# Patient Record
Sex: MALE | Race: WHITE | Employment: OTHER | URBAN - METROPOLITAN AREA
[De-identification: names, ages, dates, MRNs, and addresses within clinical notes are randomized per-mention and may not be internally consistent; named-entity substitution may affect disease eponyms.]

---

## 2017-04-25 ENCOUNTER — HOSPITAL ENCOUNTER (OUTPATIENT)
Dept: ULTRASOUND IMAGING | Age: 77
Discharge: HOME | End: 2017-04-25
Payer: MEDICARE

## 2017-04-25 DIAGNOSIS — M79.89 SWELLING OF LIMB: ICD-10-CM

## 2017-04-25 DIAGNOSIS — M79.661 PAIN OF RIGHT CALF: ICD-10-CM

## 2017-04-25 PROCEDURE — 93971 EXTREMITY STUDY: CPT

## 2017-11-12 ENCOUNTER — HOSPITAL ENCOUNTER (INPATIENT)
Age: 77
LOS: 2 days | Discharge: HOSPICE | DRG: 948 | End: 2017-11-14
Attending: EMERGENCY MEDICINE | Admitting: INTERNAL MEDICINE
Payer: MEDICARE

## 2017-11-12 ENCOUNTER — APPOINTMENT (OUTPATIENT)
Dept: GENERAL RADIOLOGY | Age: 77
DRG: 948 | End: 2017-11-12
Payer: MEDICARE

## 2017-11-12 DIAGNOSIS — C50.911 PRIMARY MALIGNANT NEOPLASM OF RIGHT BREAST WITH METASTASIS TO OTHER SITE (HCC): Primary | ICD-10-CM

## 2017-11-12 DIAGNOSIS — E86.0 DEHYDRATION: ICD-10-CM

## 2017-11-12 DIAGNOSIS — Z66 DNR (DO NOT RESUSCITATE): ICD-10-CM

## 2017-11-12 DIAGNOSIS — R52 PAIN MANAGEMENT: ICD-10-CM

## 2017-11-12 PROBLEM — G89.3 PAIN DUE TO MALIGNANT NEOPLASM METASTATIC TO BONE (HCC): Status: ACTIVE | Noted: 2017-11-12

## 2017-11-12 PROBLEM — C79.51 PAIN DUE TO MALIGNANT NEOPLASM METASTATIC TO BONE (HCC): Status: ACTIVE | Noted: 2017-11-12

## 2017-11-12 LAB
ALBUMIN SERPL-MCNC: 4.1 G/DL (ref 3.9–4.9)
ALP BLD-CCNC: 276 U/L (ref 35–104)
ALT SERPL-CCNC: 25 U/L (ref 0–41)
ANION GAP SERPL CALCULATED.3IONS-SCNC: 14 MEQ/L (ref 7–13)
ANISOCYTOSIS: PRESENT
AST SERPL-CCNC: 61 U/L (ref 0–40)
BASOPHILS ABSOLUTE: 0 K/UL (ref 0–0.2)
BASOPHILS RELATIVE PERCENT: 0.2 %
BILIRUB SERPL-MCNC: 1 MG/DL (ref 0–1.2)
BILIRUBIN URINE: NEGATIVE
BLOOD, URINE: NEGATIVE
BUN BLDV-MCNC: 27 MG/DL (ref 8–23)
CALCIUM SERPL-MCNC: 9.9 MG/DL (ref 8.6–10.2)
CHLORIDE BLD-SCNC: 94 MEQ/L (ref 98–107)
CLARITY: CLEAR
CO2: 26 MEQ/L (ref 22–29)
COLOR: YELLOW
CREAT SERPL-MCNC: 0.68 MG/DL (ref 0.7–1.2)
EOSINOPHILS ABSOLUTE: 0 K/UL (ref 0–0.7)
EOSINOPHILS RELATIVE PERCENT: 0.3 %
GFR AFRICAN AMERICAN: >60
GFR NON-AFRICAN AMERICAN: >60
GLOBULIN: 4.2 G/DL (ref 2.3–3.5)
GLUCOSE BLD-MCNC: 116 MG/DL (ref 74–109)
GLUCOSE URINE: NEGATIVE MG/DL
HCT VFR BLD CALC: 29.9 % (ref 42–52)
HEMOGLOBIN: 10 G/DL (ref 14–18)
INR BLD: 1.1
KETONES, URINE: NEGATIVE MG/DL
LEUKOCYTE ESTERASE, URINE: NEGATIVE
LYMPHOCYTES ABSOLUTE: 0.5 K/UL (ref 1–4.8)
LYMPHOCYTES RELATIVE PERCENT: 9.7 %
MAGNESIUM: 2.6 MG/DL (ref 1.7–2.3)
MCH RBC QN AUTO: 31.5 PG (ref 27–31.3)
MCHC RBC AUTO-ENTMCNC: 33.4 % (ref 33–37)
MCV RBC AUTO: 94.3 FL (ref 80–100)
MONOCYTES ABSOLUTE: 0.3 K/UL (ref 0.2–0.8)
MONOCYTES RELATIVE PERCENT: 5.6 %
NEUTROPHILS ABSOLUTE: 4.1 K/UL (ref 1.4–6.5)
NEUTROPHILS RELATIVE PERCENT: 84.2 %
NITRITE, URINE: NEGATIVE
PDW BLD-RTO: 19.4 % (ref 11.5–14.5)
PH UA: 5.5 (ref 5–9)
PLATELET # BLD: 121 K/UL (ref 130–400)
POTASSIUM SERPL-SCNC: 5.1 MEQ/L (ref 3.5–5.1)
PROTEIN UA: NEGATIVE MG/DL
PROTHROMBIN TIME: 11 SEC (ref 9.6–12.3)
RBC # BLD: 3.17 M/UL (ref 4.7–6.1)
SODIUM BLD-SCNC: 134 MEQ/L (ref 132–144)
SPECIFIC GRAVITY UA: 1.01 (ref 1–1.03)
TOTAL PROTEIN: 8.3 G/DL (ref 6.4–8.1)
URINE REFLEX TO CULTURE: ABNORMAL
UROBILINOGEN, URINE: 2 E.U./DL
WBC # BLD: 4.9 K/UL (ref 4.8–10.8)

## 2017-11-12 PROCEDURE — 74000 XR ABDOMEN LIMITED (KUB): CPT

## 2017-11-12 PROCEDURE — 6360000002 HC RX W HCPCS: Performed by: EMERGENCY MEDICINE

## 2017-11-12 PROCEDURE — 1210000000 HC MED SURG R&B

## 2017-11-12 PROCEDURE — 6360000002 HC RX W HCPCS: Performed by: INTERNAL MEDICINE

## 2017-11-12 PROCEDURE — 99285 EMERGENCY DEPT VISIT HI MDM: CPT

## 2017-11-12 PROCEDURE — 80053 COMPREHEN METABOLIC PANEL: CPT

## 2017-11-12 PROCEDURE — 96375 TX/PRO/DX INJ NEW DRUG ADDON: CPT

## 2017-11-12 PROCEDURE — 6370000000 HC RX 637 (ALT 250 FOR IP): Performed by: INTERNAL MEDICINE

## 2017-11-12 PROCEDURE — 85025 COMPLETE CBC W/AUTO DIFF WBC: CPT

## 2017-11-12 PROCEDURE — 83735 ASSAY OF MAGNESIUM: CPT

## 2017-11-12 PROCEDURE — 2580000003 HC RX 258: Performed by: EMERGENCY MEDICINE

## 2017-11-12 PROCEDURE — 96374 THER/PROPH/DIAG INJ IV PUSH: CPT

## 2017-11-12 PROCEDURE — 71010 XR CHEST PORTABLE: CPT

## 2017-11-12 PROCEDURE — 81003 URINALYSIS AUTO W/O SCOPE: CPT

## 2017-11-12 PROCEDURE — 36415 COLL VENOUS BLD VENIPUNCTURE: CPT

## 2017-11-12 PROCEDURE — 2580000003 HC RX 258: Performed by: INTERNAL MEDICINE

## 2017-11-12 PROCEDURE — 85610 PROTHROMBIN TIME: CPT

## 2017-11-12 RX ORDER — ONDANSETRON 2 MG/ML
4 INJECTION INTRAMUSCULAR; INTRAVENOUS ONCE
Status: COMPLETED | OUTPATIENT
Start: 2017-11-12 | End: 2017-11-12

## 2017-11-12 RX ORDER — GABAPENTIN 100 MG/1
100 CAPSULE ORAL 2 TIMES DAILY
Status: DISCONTINUED | OUTPATIENT
Start: 2017-11-12 | End: 2017-11-14 | Stop reason: HOSPADM

## 2017-11-12 RX ORDER — FENTANYL 25 UG/H
1 PATCH TRANSDERMAL
Status: DISCONTINUED | OUTPATIENT
Start: 2017-11-12 | End: 2017-11-14 | Stop reason: HOSPADM

## 2017-11-12 RX ORDER — SODIUM CHLORIDE 0.9 % (FLUSH) 0.9 %
10 SYRINGE (ML) INJECTION EVERY 12 HOURS SCHEDULED
Status: DISCONTINUED | OUTPATIENT
Start: 2017-11-12 | End: 2017-11-14 | Stop reason: HOSPADM

## 2017-11-12 RX ORDER — SODIUM CHLORIDE 0.9 % (FLUSH) 0.9 %
10 SYRINGE (ML) INJECTION PRN
Status: DISCONTINUED | OUTPATIENT
Start: 2017-11-12 | End: 2017-11-14 | Stop reason: HOSPADM

## 2017-11-12 RX ORDER — ONDANSETRON 2 MG/ML
4 INJECTION INTRAMUSCULAR; INTRAVENOUS EVERY 6 HOURS PRN
Status: DISCONTINUED | OUTPATIENT
Start: 2017-11-12 | End: 2017-11-14 | Stop reason: HOSPADM

## 2017-11-12 RX ORDER — ACETAMINOPHEN 325 MG/1
650 TABLET ORAL EVERY 4 HOURS PRN
Status: DISCONTINUED | OUTPATIENT
Start: 2017-11-12 | End: 2017-11-14 | Stop reason: HOSPADM

## 2017-11-12 RX ORDER — SODIUM CHLORIDE 9 MG/ML
INJECTION, SOLUTION INTRAVENOUS CONTINUOUS
Status: DISCONTINUED | OUTPATIENT
Start: 2017-11-12 | End: 2017-11-14 | Stop reason: HOSPADM

## 2017-11-12 RX ORDER — OXYCODONE HYDROCHLORIDE 5 MG/1
5 TABLET ORAL EVERY 4 HOURS PRN
Status: DISCONTINUED | OUTPATIENT
Start: 2017-11-12 | End: 2017-11-14 | Stop reason: HOSPADM

## 2017-11-12 RX ORDER — POLYETHYLENE GLYCOL 3350 17 G/17G
17 POWDER, FOR SOLUTION ORAL DAILY PRN
Status: DISCONTINUED | OUTPATIENT
Start: 2017-11-12 | End: 2017-11-14 | Stop reason: HOSPADM

## 2017-11-12 RX ORDER — MELOXICAM 7.5 MG/1
15 TABLET ORAL DAILY
Status: DISCONTINUED | OUTPATIENT
Start: 2017-11-12 | End: 2017-11-14 | Stop reason: HOSPADM

## 2017-11-12 RX ORDER — FENTANYL CITRATE 50 UG/ML
100 INJECTION, SOLUTION INTRAMUSCULAR; INTRAVENOUS ONCE
Status: COMPLETED | OUTPATIENT
Start: 2017-11-12 | End: 2017-11-12

## 2017-11-12 RX ORDER — 0.9 % SODIUM CHLORIDE 0.9 %
1000 INTRAVENOUS SOLUTION INTRAVENOUS ONCE
Status: COMPLETED | OUTPATIENT
Start: 2017-11-12 | End: 2017-11-12

## 2017-11-12 RX ORDER — OXYCODONE HYDROCHLORIDE 5 MG/1
2.5 TABLET ORAL EVERY 4 HOURS PRN
Status: DISCONTINUED | OUTPATIENT
Start: 2017-11-12 | End: 2017-11-14 | Stop reason: HOSPADM

## 2017-11-12 RX ORDER — MORPHINE SULFATE 2 MG/ML
1 INJECTION, SOLUTION INTRAMUSCULAR; INTRAVENOUS
Status: DISCONTINUED | OUTPATIENT
Start: 2017-11-12 | End: 2017-11-14 | Stop reason: HOSPADM

## 2017-11-12 RX ORDER — SENNA PLUS 8.6 MG/1
1 TABLET ORAL 2 TIMES DAILY PRN
Status: DISCONTINUED | OUTPATIENT
Start: 2017-11-12 | End: 2017-11-14 | Stop reason: HOSPADM

## 2017-11-12 RX ORDER — DOCUSATE SODIUM 100 MG/1
100 CAPSULE, LIQUID FILLED ORAL DAILY PRN
Status: DISCONTINUED | OUTPATIENT
Start: 2017-11-12 | End: 2017-11-14 | Stop reason: HOSPADM

## 2017-11-12 RX ORDER — DIGOXIN 125 MCG
125 TABLET ORAL DAILY
Status: DISCONTINUED | OUTPATIENT
Start: 2017-11-12 | End: 2017-11-14 | Stop reason: HOSPADM

## 2017-11-12 RX ORDER — MORPHINE SULFATE 2 MG/ML
2 INJECTION, SOLUTION INTRAMUSCULAR; INTRAVENOUS
Status: DISCONTINUED | OUTPATIENT
Start: 2017-11-12 | End: 2017-11-14 | Stop reason: HOSPADM

## 2017-11-12 RX ADMIN — ENOXAPARIN SODIUM 40 MG: 40 INJECTION, SOLUTION INTRAVENOUS; SUBCUTANEOUS at 21:53

## 2017-11-12 RX ADMIN — SODIUM CHLORIDE: 9 INJECTION, SOLUTION INTRAVENOUS at 10:55

## 2017-11-12 RX ADMIN — MAGNESIUM CITRATE 296 ML: 1.75 LIQUID ORAL at 10:59

## 2017-11-12 RX ADMIN — FENTANYL CITRATE 100 MCG: 50 INJECTION INTRAMUSCULAR; INTRAVENOUS at 03:15

## 2017-11-12 RX ADMIN — OXYCODONE HYDROCHLORIDE 5 MG: 5 TABLET ORAL at 08:42

## 2017-11-12 RX ADMIN — GABAPENTIN 100 MG: 100 CAPSULE ORAL at 08:42

## 2017-11-12 RX ADMIN — SODIUM CHLORIDE 1000 ML: 9 INJECTION, SOLUTION INTRAVENOUS at 03:15

## 2017-11-12 RX ADMIN — ONDANSETRON 4 MG: 2 INJECTION INTRAMUSCULAR; INTRAVENOUS at 03:15

## 2017-11-12 RX ADMIN — METHYLNALTREXONE BROMIDE 12 MG: 12 INJECTION, SOLUTION SUBCUTANEOUS at 10:58

## 2017-11-12 RX ADMIN — DIGOXIN 125 MCG: 0.12 TABLET ORAL at 08:42

## 2017-11-12 RX ADMIN — GABAPENTIN 100 MG: 100 CAPSULE ORAL at 21:37

## 2017-11-12 RX ADMIN — GUAIFENESIN AND DEXTROMETHORPHAN HYDROBROMIDE 1 TABLET: 600; 30 TABLET, EXTENDED RELEASE ORAL at 08:43

## 2017-11-12 RX ADMIN — MELOXICAM 15 MG: 7.5 TABLET ORAL at 08:42

## 2017-11-12 ASSESSMENT — ENCOUNTER SYMPTOMS
ABDOMINAL PAIN: 1
VOICE CHANGE: 0
CHOKING: 0
SINUS PRESSURE: 0
DIARRHEA: 0
EYE REDNESS: 0
CONSTIPATION: 1
COUGH: 0
CHEST TIGHTNESS: 0
BLOOD IN STOOL: 0
EYE DISCHARGE: 0
BACK PAIN: 0
EYE PAIN: 0
VOMITING: 0
TROUBLE SWALLOWING: 0
WHEEZING: 0
SORE THROAT: 0
FACIAL SWELLING: 0
STRIDOR: 0
SHORTNESS OF BREATH: 0

## 2017-11-12 ASSESSMENT — PAIN SCALES - GENERAL
PAINLEVEL_OUTOF10: 0
PAINLEVEL_OUTOF10: 7
PAINLEVEL_OUTOF10: 7
PAINLEVEL_OUTOF10: 5
PAINLEVEL_OUTOF10: 5

## 2017-11-12 ASSESSMENT — PAIN DESCRIPTION - DESCRIPTORS
DESCRIPTORS: ACHING
DESCRIPTORS: ACHING

## 2017-11-12 ASSESSMENT — PAIN DESCRIPTION - LOCATION
LOCATION: COCCYX
LOCATION: ABDOMEN;BACK

## 2017-11-12 ASSESSMENT — PAIN DESCRIPTION - PAIN TYPE
TYPE: ACUTE PAIN
TYPE: ACUTE PAIN

## 2017-11-12 NOTE — H&P
Hospital Medicine History & Physical      PCP: Obi Mendoza    Date of Admission: 11/12/2017    Date of Service: 11/12/17      Chief Complaint:  noncontrolled pain       History Of Present Illness:  69 y.o. male who presented to [facility] with known history breast CA with bones metastases for the last 8 years, developed severe constipation, noncontrolled pelvic/back pain and visited ER where after stabilization he was admitted for further management     Past Medical History:          Diagnosis Date    Cancer ([facility] [address]     Breast Stage 4    Hypertension     Muscle spasm     Vitamin B6 deficiency        Past Surgical History:          Procedure Laterality Date    MASTECTOMY Right        Medications Prior to Admission:      Prior to Admission medications    Medication Sig Start Date End Date Taking? Authorizing Provider   Multiple Vitamins-Minerals (CENTRUM SILVER PO) Take by mouth every morning   Yes Historical Provider, MD   GABAPENTIN PO Take 100 mg by mouth 2 times daily   Yes Historical Provider, MD   Pyridoxine HCl (VITAMIN B-6) 100 MG tablet Take 100 mg by mouth 2 times daily   Yes Historical Provider, MD   aspirin 81 MG tablet Take 81 mg by mouth daily    Historical Provider, MD   digoxin (LANOXIN) 125 MCG tablet Take 125 mcg by mouth daily    Historical Provider, MD   metoprolol tartrate (LOPRESSOR) 50 MG tablet Take 50 mg by mouth 2 times daily    Historical Provider, MD   midodrine (PROAMATINE) 5 MG tablet Take 5 mg by mouth daily     Historical Provider, MD       Allergies:  Review of patient's allergies indicates no known allergies. Social History:      The patient currently lives at home    TOBACCO:   reports that he has never smoked. He does not have any smokeless tobacco history on file. ETOH:   reports that he does not drink alcohol. Family History:       Reviewed in detail and negative for DM, CAD, Cancer, CVA. History reviewed. No pertinent family history.     REVIEW OF SYSTEMS: (Results Pending)       ASSESSMENT:    There are no active hospital problems to display for this patient. PLAN:        DVT Prophylaxis: lovenox  Diet: DIET GENERAL; Dysphagia I Pureed  Code Status: DNR-CC    PT/OT Eval Status:     Dispo - Chronic cancer related pain in setting metastatic breast CA- better controlled with fentanyl patch and IV morphine, hospice on case  Constipation x 7 days due to opioids use- try relistor injection, mag citrate, follow up clinically  Dehydration - continue with IV NS  Poor prognosis- code status dnr CC. Hospice on case        Linda Epstein MD    Thank you Sis Langford for the opportunity to be involved in this patient's care. If you have any questions or concerns please feel free to contact me.

## 2017-11-12 NOTE — ED PROVIDER NOTES
use: No    Sexual activity: Not Asked     Other Topics Concern    None     Social History Narrative    None       SCREENINGS             PHYSICAL EXAM    (up to 7 for level 4, 8 or more for level 5)     ED Triage Vitals [11/12/17 0301]   BP Temp Temp Source Pulse Resp SpO2 Height Weight   120/60 97.6 °F (36.4 °C) Oral 88 20 97 % -- --       Physical Exam   Constitutional: He is oriented to person, place, and time. He appears well-developed. Patient does slightly pale looking hard of hearing cooperative moving all the extremities well   HENT:   Head: Normocephalic and atraumatic. Mouth/Throat: No oropharyngeal exudate. Eyes: Conjunctivae are normal. Right eye exhibits no discharge. Left eye exhibits no discharge. No scleral icterus. Neck: Normal range of motion. Neck supple. No JVD present. No tracheal deviation present. No thyromegaly present. Cardiovascular: Normal rate and regular rhythm. Exam reveals no gallop. Murmur heard. Pulmonary/Chest: Breath sounds normal. No respiratory distress. He has no wheezes. Abdominal: Soft. Bowel sounds are normal. He exhibits no mass. There is no tenderness. There is no rebound. Attention given to the abdomen patient no distention of the abdomen mild epigastric tenderness no rebound tenderness no McBurney's point tenderness no CVA tenderness   Musculoskeletal: Normal range of motion. He exhibits no edema or tenderness. Lymphadenopathy:     He has no cervical adenopathy. Neurological: He is alert and oriented to person, place, and time. No cranial nerve deficit. He exhibits normal muscle tone. Skin: Skin is warm. No rash noted. No erythema.    Psychiatric: His behavior is normal. Thought content normal.       DIAGNOSTIC RESULTS     EKG: All EKG's are interpreted by the Emergency Department Physician who either signs or Co-signs this chart in the absence of a cardiologist.        RADIOLOGY:   Non-plain film images such as CT, Ultrasound and MRI are read by the radiologist. Laura Coburn radiographic images are visualized and preliminarily interpreted by the emergency physician with the below findings:      Interpretation per the Radiologist below, if available at the time of this note:    XR Chest Portable    (Results Pending)   XR ABDOMEN (KUB) (SINGLE AP VIEW)    (Results Pending)         ED BEDSIDE ULTRASOUND:   Performed by ED Physician - none    LABS:  Labs Reviewed   COMPREHENSIVE METABOLIC PANEL - Abnormal; Notable for the following:        Result Value    Chloride 94 (*)     Anion Gap 14 (*)     Glucose 116 (*)     BUN 27 (*)     CREATININE 0.68 (*)     Total Protein 8.3 (*)     Alkaline Phosphatase 276 (*)     AST 61 (*)     Globulin 4.2 (*)     All other components within normal limits   MAGNESIUM - Abnormal; Notable for the following:     Magnesium 2.6 (*)     All other components within normal limits   CBC WITH AUTO DIFFERENTIAL - Abnormal; Notable for the following:     RBC 3.17 (*)     Hemoglobin 10.0 (*)     Hematocrit 29.9 (*)     MCH 31.5 (*)     RDW 19.4 (*)     Platelets 473 (*)     Lymphocytes # 0.5 (*)     All other components within normal limits   URINE RT REFLEX TO CULTURE - Abnormal; Notable for the following:     Urobilinogen, Urine 2.0 (*)     All other components within normal limits   PROTIME-INR       All other labs were within normal range or not returned as of this dictation. EMERGENCY DEPARTMENT COURSE and DIFFERENTIAL DIAGNOSIS/MDM:   Vitals:    Vitals:    11/12/17 0301   BP: 120/60   Pulse: 88   Resp: 20   Temp: 97.6 °F (36.4 °C)   TempSrc: Oral   SpO2: 97%           MDM    CRITICAL CARE TIME   Total Critical Care time was minutes, excluding separately reportable procedures. There was a high probability of clinically significant/life threatening deterioration in the patient's condition which required my urgent intervention.   ONSULTS:  IP CONSULT TO HOSPITALIST    PROCEDURES:  Unless otherwise noted below, none     Procedures    FINAL

## 2017-11-13 PROCEDURE — 6370000000 HC RX 637 (ALT 250 FOR IP): Performed by: INTERNAL MEDICINE

## 2017-11-13 PROCEDURE — 2580000003 HC RX 258: Performed by: INTERNAL MEDICINE

## 2017-11-13 PROCEDURE — 1210000000 HC MED SURG R&B

## 2017-11-13 RX ORDER — FENTANYL 25 UG/H
1 PATCH TRANSDERMAL
Qty: 1 PATCH | Refills: 0 | Status: SHIPPED | OUTPATIENT
Start: 2017-11-15 | End: 2017-11-18

## 2017-11-13 RX ORDER — OXYCODONE HYDROCHLORIDE 5 MG/1
5 TABLET ORAL EVERY 4 HOURS PRN
Qty: 20 TABLET | Refills: 0 | Status: SHIPPED | OUTPATIENT
Start: 2017-11-13 | End: 2017-11-20

## 2017-11-13 RX ORDER — SODIUM PHOSPHATE,MONO-DIBASIC 19G-7G/118
1 ENEMA (ML) RECTAL ONCE
Status: COMPLETED | OUTPATIENT
Start: 2017-11-13 | End: 2017-11-13

## 2017-11-13 RX ADMIN — DIGOXIN 125 MCG: 0.12 TABLET ORAL at 09:21

## 2017-11-13 RX ADMIN — SODIUM CHLORIDE: 9 INJECTION, SOLUTION INTRAVENOUS at 12:44

## 2017-11-13 RX ADMIN — GABAPENTIN 100 MG: 100 CAPSULE ORAL at 21:54

## 2017-11-13 RX ADMIN — SODIUM CHLORIDE: 9 INJECTION, SOLUTION INTRAVENOUS at 00:15

## 2017-11-13 RX ADMIN — MELOXICAM 15 MG: 7.5 TABLET ORAL at 09:21

## 2017-11-13 RX ADMIN — GABAPENTIN 100 MG: 100 CAPSULE ORAL at 09:21

## 2017-11-13 RX ADMIN — SODIUM PHOSPHATE, DIBASIC AND SODIUM PHOSPHATE, MONOBASIC 1 ENEMA: 7; 19 ENEMA RECTAL at 12:26

## 2017-11-13 RX ADMIN — METOPROLOL TARTRATE 25 MG: 25 TABLET, FILM COATED ORAL at 21:54

## 2017-11-13 ASSESSMENT — PAIN SCALES - GENERAL
PAINLEVEL_OUTOF10: 5
PAINLEVEL_OUTOF10: 2

## 2017-11-13 ASSESSMENT — PAIN DESCRIPTION - PAIN TYPE: TYPE: ACUTE PAIN

## 2017-11-13 ASSESSMENT — PAIN DESCRIPTION - LOCATION: LOCATION: COCCYX

## 2017-11-13 ASSESSMENT — PAIN DESCRIPTION - DESCRIPTORS: DESCRIPTORS: ACHING

## 2017-11-13 NOTE — PLAN OF CARE
Problem: Nutrition  Goal: Optimal nutrition therapy  Nutrition Problem: Severe malnutrition, in context of chronic illness  Intervention: Food and/or Nutrient Delivery: Continue current diet, Continue current ONS  Nutritional Goals: Provide diet pt can safely tolerate and encourage intake when pt feels well enough to eat. Provide comfort care.   Outcome: Ongoing

## 2017-11-13 NOTE — PROGRESS NOTES
Consents for hospice have been signed. Beronica Fuentes, RN with [facility] ordered DME to be delivered to pt's home tonight. The plan is for him to be picked up at 12 by [facility]. Hospice will call for discharge instructions and asks that they be faxed to their office at [phone]. Prescriptions can be call to [facility] in [place].

## 2017-11-13 NOTE — DISCHARGE SUMMARY
Discharge Summary    Patient:  Erica Keating  YOB: 1940    MRN: [id number]   Acct: [de-identified]    Primary Care Physician: Loulou Ventura date:  11/12/2017    Discharge date:   11/13/17      Discharge Diagnoses:   Pain due to malignant neoplasm metastatic to bone [facility])  Principal Problem:    Pain due to malignant neoplasm metastatic to bone [facility])  Active Problems:    Breast cancer ([facility] [address]    Bone metastasis ([facility] [address]      Admitted for: [facility] Course: pt was medicated with IV morphine, fentanyl patch was applied. Pt was hydrated. Pain is better controlled, had BM. Will be Dc home under hospice care    Consultants:  hospice    Discharge Medications:     Medication List      START taking these medications    fentaNYL 25 MCG/HR  Commonly known as:  [address] 1 patch onto the skin every 72 hours for 3 days . Start taking on:  11/15/2017     oxyCODONE 5 MG immediate release tablet  Commonly known as:  ROXICODONE  Take 1 tablet by mouth every 4 hours as needed for Pain .         CHANGE how you take these medications    metoprolol tartrate 25 MG tablet  Commonly known as:  LOPRESSOR  Take 1 tablet by mouth 2 times daily  What changed:  · medication strength  · how much to take        CONTINUE taking these medications    aspirin 81 MG tablet     CENTRUM SILVER PO     digoxin 125 MCG tablet  Commonly known as:  LANOXIN     GABAPENTIN PO     midodrine 5 MG tablet  Commonly known as:  PROAMATINE     vitamin B-6 100 MG tablet  Commonly known as:  PYRIDOXINE        STOP taking these medications    CALTRATE 600+D 600-400 MG-UNIT Tabs per tab  Generic drug:  calcium carbonate-vitamin D     cyclobenzaprine 10 MG tablet  Commonly known as:  FLEXERIL     OMEGA-3 FATTY ACIDS PO     oxyCODONE-acetaminophen 5-325 MG per tablet  Commonly known as:  PERCOCET           Where to Get Your Medications      You can get these medications from any pharmacy    Bring a paper prescription for each of these

## 2017-11-13 NOTE — PROGRESS NOTES
Nutrition Assessment    Type and Reason for Visit: Initial, Positive Nutrition Screen (admitted for dehydration & opiod associated constipation)    Nutrition Recommendations: Continue current diet, Continue current ONS. Agree with continue IVF. Malnutrition Assessment:  · Malnutrition Status: Meets the criteria for severe malnutrition  · Context: Chronic illness  · Findings of the 6 clinical characteristics of malnutrition (Minimum of 2 out of 6 clinical characteristics is required to make the diagnosis of moderate or severe Protein Calorie Malnutrition based on AND/ASPEN Guidelines):  1. Energy Intake-Less than or equal to 50%, greater than or equal to 3 months    2. Weight Loss-7.5% loss or greater,  (4 months)  3. Fat Loss-Severe subcutaneous fat loss, Orbital, Triceps, Fat overlying the ribs  4. Muscle Loss-Severe muscle mass loss, Clavicles (pectoralis and deltoids), Thigh (quadriceps), Temples (temporalis muscle), Calf (gastrocnemius), Interosseous  5. Fluid Accumulation-No significant fluid accumulation,      Nutrition Diagnosis:   · Problem: Severe malnutrition, in context of chronic illness  · Etiology: related to Catabolic illness, Insufficient energy/nutrient consumption     Signs and symptoms:  as evidenced by Severe loss of subcutaneous fat, Severe muscle loss, Weight loss, Diet history of poor intake    Nutrition Assessment:  · Subjective Assessment: Pt with lunch at bedside. Per staff he was choking with medications given in applesauce this am.  Diet downgraded to pureed, but he has not tried to eat, so unsure if he is tolerating. He told staff his current weight is 140#, but he appears very cachectic and thinner than stated weight. He has been recieving cancer tx for 8 yrs. POC is to d/c with Hospice services. · Nutrition-Focused Physical Findings: c/o constipation, but BM noted 11/11,11/12. Received enema today. c/o abdominal pain on admit. Skin is very dry & red in areas.   Bruising

## 2017-11-14 VITALS
HEART RATE: 84 BPM | TEMPERATURE: 97.9 F | DIASTOLIC BLOOD PRESSURE: 62 MMHG | HEIGHT: 67 IN | SYSTOLIC BLOOD PRESSURE: 91 MMHG | RESPIRATION RATE: 20 BRPM | OXYGEN SATURATION: 97 %

## 2017-11-14 PROCEDURE — 2580000003 HC RX 258: Performed by: INTERNAL MEDICINE

## 2017-11-14 PROCEDURE — 6360000002 HC RX W HCPCS: Performed by: INTERNAL MEDICINE

## 2017-11-14 PROCEDURE — 6370000000 HC RX 637 (ALT 250 FOR IP): Performed by: INTERNAL MEDICINE

## 2017-11-14 RX ADMIN — MORPHINE SULFATE 2 MG: 2 INJECTION, SOLUTION INTRAMUSCULAR; INTRAVENOUS at 02:51

## 2017-11-14 RX ADMIN — ENOXAPARIN SODIUM 40 MG: 40 INJECTION, SOLUTION INTRAVENOUS; SUBCUTANEOUS at 09:00

## 2017-11-14 RX ADMIN — MELOXICAM 15 MG: 7.5 TABLET ORAL at 09:00

## 2017-11-14 RX ADMIN — SODIUM CHLORIDE: 9 INJECTION, SOLUTION INTRAVENOUS at 02:50

## 2017-11-14 RX ADMIN — DIGOXIN 125 MCG: 0.12 TABLET ORAL at 09:00

## 2017-11-14 RX ADMIN — GABAPENTIN 100 MG: 100 CAPSULE ORAL at 09:00

## 2017-11-14 RX ADMIN — METOPROLOL TARTRATE 25 MG: 25 TABLET, FILM COATED ORAL at 09:00

## 2017-11-14 RX ADMIN — OXYCODONE HYDROCHLORIDE 5 MG: 5 TABLET ORAL at 09:00

## 2017-11-14 ASSESSMENT — PAIN DESCRIPTION - PROGRESSION
CLINICAL_PROGRESSION: GRADUALLY WORSENING

## 2017-11-14 ASSESSMENT — PAIN SCALES - GENERAL
PAINLEVEL_OUTOF10: 7
PAINLEVEL_OUTOF10: 7
PAINLEVEL_OUTOF10: 0

## 2017-11-14 ASSESSMENT — PAIN DESCRIPTION - LOCATION: LOCATION: COCCYX

## 2017-11-14 ASSESSMENT — PAIN DESCRIPTION - ONSET: ONSET: GRADUAL

## 2017-11-14 ASSESSMENT — PAIN DESCRIPTION - ORIENTATION: ORIENTATION: MID

## 2017-11-14 ASSESSMENT — PAIN DESCRIPTION - DESCRIPTORS: DESCRIPTORS: ACHING

## 2017-11-14 ASSESSMENT — PAIN DESCRIPTION - PAIN TYPE: TYPE: CHRONIC PAIN

## 2017-11-14 NOTE — PROGRESS NOTES
[facility] at facility to transport pt. Pt shows no s/s of distress or discomfort. Caregiver at bedside with patient. No further complaints voiced.

## 2017-11-14 NOTE — PROGRESS NOTES
Report given to Wen of [facility] Discharge instructions to be sent with pt upon discharge. Pt aware of discharge. No further complaints voiced.

## 2017-11-14 NOTE — PROGRESS NOTES
Hospitalist Progress Note      PCP: Renny Thomas    Date of Admission: 11/12/2017    Chief Complaint: pain    Subjective: pt awake, alert, looks comfortable     Medications:  Reviewed    Infusion Medications    sodium chloride 75 mL/hr at 11/14/17 0250     Scheduled Medications    gabapentin  100 mg Oral BID    digoxin  125 mcg Oral Daily    metoprolol tartrate  25 mg Oral BID    sodium chloride flush  10 mL Intravenous 2 times per day    enoxaparin  40 mg Subcutaneous Daily    fentaNYL  1 patch Transdermal Q72H    meloxicam  15 mg Oral Daily     PRN Meds: sodium chloride flush, acetaminophen, oxyCODONE **OR** oxyCODONE, morphine **OR** morphine, magnesium hydroxide, ondansetron, polyethylene glycol, senna, docusate sodium, dextromethorphan-guaiFENesin      Intake/Output Summary (Last 24 hours) at 11/14/17 306  Last data filed at 11/14/17 0766   Gross per 24 hour   Intake              600 ml   Output             1300 ml   Net             -700 ml       Exam:    BP 91/62   Pulse 84   Temp 97.9 °F (36.6 °C) (Oral)   Resp 20   Ht 5' 7\" (1.702 m) Comment: value from old chart records  SpO2 97%     General appearance: No apparent distress, appears stated age and cooperative. HEENT: Pupils equal, round, and reactive to light. Conjunctivae/corneas clear. Neck: Supple, with full range of motion. No jugular venous distention. Trachea midline. Respiratory:  Normal respiratory effort. Clear to auscultation, bilaterally without Rales/Wheezes/Rhonchi. Cardiovascular: Regular rate and rhythm with normal S1/S2 without murmurs, rubs or gallops. Abdomen: Soft, non-tender, non-distended with normal bowel sounds. Musculoskeletal: No clubbing, cyanosis or edema bilaterally. Full range of motion without deformity. Skin: Skin color, texture, turgor normal.  No rashes or lesions. Neurologic:  Neurovascularly intact without any focal sensory/motor deficits.  Cranial nerves: II-XII intact, grossly non-focal.  Psychiatric: Alert and oriented, thought content appropriate, normal insight  Capillary Refill: Brisk,< 3 seconds   Peripheral Pulses: +2 palpable, equal bilaterally       Labs:   Recent Labs      11/12/17 0320   WBC  4.9   HGB  10.0*   HCT  29.9*   PLT  121*     Recent Labs      11/12/17 0320   NA  134   K  5.1   CL  94*   CO2  26   BUN  27*   CREATININE  0.68*   CALCIUM  9.9     Recent Labs      11/12/17 0320   AST  61*   ALT  25   BILITOT  1.0   ALKPHOS  276*     Recent Labs      11/12/17 0320   INR  1.1     No results for input(s): CKTOTAL, TROPONINI in the last 72 hours. Urinalysis:    Lab Results   Component Value Date    NITRU Negative 11/12/2017    BLOODU Negative 11/12/2017    SPECGRAV 1.015 11/12/2017    GLUCOSEU Negative 11/12/2017       Radiology:  XR Chest Portable   Final Result   EXTENSIVE SKELETAL METASTASES. EXAMINATION: CHEST, TWO VIEWS:      CLINICAL HISTORY: EPIGASTRIC PAIN, ABDOMINAL PAIN, CONSTIPATION. FINDINGS: Cardiac and mediastinal silhouettes are normal in size and contour. No focal areas of consolidations are noted. No effusion or pneumothorax is seen. Diffuse sclerotic bone metastases. IMPRESSION      BONE METASTASES. NO ACUTE LUNG PROCESS. XR ABDOMEN (KUB) (SINGLE AP VIEW)   Final Result   EXTENSIVE SKELETAL METASTASES. EXAMINATION: CHEST, TWO VIEWS:      CLINICAL HISTORY: EPIGASTRIC PAIN, ABDOMINAL PAIN, CONSTIPATION. FINDINGS: Cardiac and mediastinal silhouettes are normal in size and contour. No focal areas of consolidations are noted. No effusion or pneumothorax is seen. Diffuse sclerotic bone metastases. IMPRESSION      BONE METASTASES. NO ACUTE LUNG PROCESS.                                 Assessment/Plan:    Active Hospital Problems    Diagnosis Date Noted    Pain due to malignant neoplasm metastatic to bone ([facility] [address] [G89.3, C79.51] 11/12/2017    Breast cancer ([facility] [address] Shanel Kimball 10/11/2016    Bone metastasis ([facility] [address] [C79.51] 10/11/2016         DVT Prophylaxis: lovenox  Diet: DIET GENERAL; Dysphagia I Pureed  Dietary Nutrition Supplements: Frozen Oral Supplement  Code Status: DNR-CC    PT/OT Eval Status:     Dispo - Chronic cancer related pain in setting metastatic breast CA- better controlled with fentanyl patch and IV morphine, hospice on case  Constipation- resolved  Dehydration - continue with IV NS  DC under hospice today       Electronically signed by Boris Sneed MD on 11/14/2017 at 9:39 AM